# Patient Record
Sex: MALE | Race: WHITE | NOT HISPANIC OR LATINO | Employment: FULL TIME | URBAN - METROPOLITAN AREA
[De-identification: names, ages, dates, MRNs, and addresses within clinical notes are randomized per-mention and may not be internally consistent; named-entity substitution may affect disease eponyms.]

---

## 2024-11-20 ENCOUNTER — APPOINTMENT (OUTPATIENT)
Dept: OTOLARYNGOLOGY | Facility: CLINIC | Age: 44
End: 2024-11-20

## 2024-11-20 VITALS — WEIGHT: 233 LBS | TEMPERATURE: 98 F | HEIGHT: 74 IN | BODY MASS INDEX: 29.9 KG/M2

## 2024-11-20 DIAGNOSIS — G47.33 OSA (OBSTRUCTIVE SLEEP APNEA): ICD-10-CM

## 2024-11-20 PROCEDURE — 31575 DIAGNOSTIC LARYNGOSCOPY: CPT

## 2024-11-20 PROCEDURE — 3008F BODY MASS INDEX DOCD: CPT

## 2024-11-20 PROCEDURE — 99204 OFFICE O/P NEW MOD 45 MIN: CPT

## 2024-11-20 RX ORDER — LOSARTAN POTASSIUM AND HYDROCHLOROTHIAZIDE 12.5; 5 MG/1; MG/1
1 TABLET ORAL
COMMUNITY
Start: 2024-10-24 | End: 2025-01-22

## 2024-11-20 RX ORDER — ALBUTEROL SULFATE 90 UG/1
INHALANT RESPIRATORY (INHALATION)
COMMUNITY
Start: 2024-03-06

## 2024-11-20 RX ORDER — AMLODIPINE BESYLATE 10 MG/1
10 TABLET ORAL
COMMUNITY
Start: 2023-12-15

## 2024-11-20 NOTE — PROGRESS NOTES
"11/20/2024 New patient visit for SAILAJA and sleep consult    Pt saw Dr Rhoda Tobin MD. from [facility] on 4/11/2023 and she writes  \"Patient is a 42 year old male with past medical history of HTN and SAILAJA who is here at the sleep disorder center to learn more about treatment options for his severe obstructive sleep apnea. Patient was diagnosed with SAILAJA in 2020 with a home sleep apnea test showing a total MEDARDO of 48 events per hour. During that sleep study, possible central apneas were observed for which a PAP titration was recommended. He was started on auto CPAP 5-16 instead and since then he has had difficulties with pressures. He denies any daytime sleepiness his only complain is snoring. His difficulties with his CPAP are\" difficulties exhaling, feels claustrophobic due to high pressures. He has been wearing an oral appliance since last year. He got it from his dentist, his snoring improved but he never underwent sleep testing again.     Other treatment options including INSPIRE were discussed with him and also the role of weight loss. I reviewed his old download when he was using it back in [id number] and his residual AHI was normal at least for the 1 hour of usage that was recorded. This makes me think that he doesn't have concomitant central sleep apneas seen in his initial home sleep apnea test.     -I was able to adjust his pressures to 6-13 cm of water pressure remotely along with adjusting his RAMP pressure to 45 min, e-flex was at 3  -Recommend a follow up visit in office in 1 month. If issues continue will consider INSPIRE as a treatment option, however he will need a in-lab PSG before he is evaluated by ENT to ensure he doesn't have complex sleep apnea\"      Patient can not tolerate PAP. Has lost weight but still significantly tired and sleepy.     Sleep study histories: (personally reviewed raw data such as interpretation report, data sheet, hypnogram, and titration table)  Home Sleep Test 11/16/2020 - AHI " 48.3.  Obstructive = 0, Mixed = 5, Central = 157 and Hypopneas = 146.   SPO2 cheng was 81%    Patient does reports nasal obstruction.  Patient had septoplasty but only moderate improvement in nasal obstruction.       Past PAP compliance   Compliance Summary  12/30/2020 - 1/22/2021 (24 days)  Days with Device Usage 22 days  Days without Device Usage 2 days  Percent Days with Device Usage 91.7%  Cumulative Usage 22 hrs. 57 mins. 54 secs.  Maximum Usage (1 Day) 3 hrs. 32 mins. 54 secs.  Average Usage (All Days) 57 mins. 24 secs.  Average Usage (Days Used) 1 hrs. 2 mins. 37 secs.  Minimum Usage (1 Day) 8 secs.  Percent of Days with Usage >= 4 Hours 0.0%  Percent of Days with Usage < 4 Hours 100.0%  Date Range  Total Blower Time 23 hrs. 45 mins. 3 secs.  Average AHI 4.8  Auto-CPAP Summary  Auto-CPAP Mean Pressure 6.2 cmH2O  Auto-CPAP Peak Average Pressure 10.0 cmH2O  Device Pressure <= 90% of Time 7.5 cmH2O  Average Time in Large Leak Per Day 8 secs.  Parameter Value  Min Pressure 6 cmH2O  Max Pressure 16 cmH2O       Physical Exam:    GENERAL:  Well-developed, well-nourished.      EYES:  Ocular motility intact.       EARS:  Otoscopy of external auditory canals and tympanic membranes is normal with clinical speech reception thresholds grossly intact. No mass/lesion of auricle.      NOSE:   Anterior rhinoscopy shows septum is midline bilateral inferior turbinate in adequate size.  Right Turbinate: 2  Left Turbinate: 2  Nasal Valve collapse: No, Woodward Maneuver is negative  Nasal mucosa is unremarkable.  There are no other masses polyps or purulent drainage.      NECK:  No cervical lymphadenopathy, no neck mass/crepitus/ asymmetry, trachea is midline, no thyroid enlargement/tenderness/mass.      OROPHARYNX:   Tonsil size: 1  Modified Mallampatti tongue position: 3  Tongue position is Freidman 3  Scalloping is noted.   Soft Palate: is low-lying;  is redundant  Patient has tongue-tie that restricts anterior tongue movement  and coupling to palate.     MAXILLOFACIAL:   On frontal repose, patient shows symmetrical facial thirds, symmetrical facial fifths.   There is not paranasal flattening.  There is not deep nasolabial folds.   On profile view, the patient has a adequate facial profile, with a Class 1 Facial Skeletal relationship. There is not a dorsal nasal hump.  Patient has a high arched palate with restricted transverse maxillary dimensions.     DENTAL:  The occlusal plane is normal. No Overjet. Overbite is not deep.  Right - Class 1 canine, Class 1 molar  Left - Class 1 canine, Class 1 molar  There is no transverse discrepancy or narrow maxilla.  Dentition: There is adequate dentition. There is no dental crowding.   No cross-bite.      TMJ:  On TMJ examination, the maximal incisal opening is about 45 mm. The jaw does not deviate towards the  upon opening. There is no TMJ click/pop. There is no TMJ tenderness upon function.      NEURO/PSYCH:  Cranial nerves grossly intact, oriented x3 (time, place, person), appropriate mood and affect.      RESPIRATION:  Symmetric expansion during respiration, normal respiratory effort.      CARDIOVASCULAR: Pulse is regular rhythm and rate      Procedure: Diagnostic Nasal/ Pharyngeal Endoscopy     Indication for procedure: To evaluate static and dynamic upper anatomy not visible by anterior rhinoscopy and oral cavitiy examination for anatomic risk factors of obstructive sleep apnea.      Anesthesia: 1% phenylephrine,4% lidocaine topical spray     Description:   A flexible endoscope was used to examine the left and right nasal cavities.  The nasal valve areas were examined for abnormalities or collapse.  The inferior and middle turbinates were evaluated and abnormalities noted. The scope was then passed through the nasopharygneal, oropharyngeal, and hypopharyngeal airway. The Martinez Maneuver was performed with the patient in sitting position.Collapse was assessed during a maximal inspiratory effort  against a closed mouth and sealed nose. The patient tolerated the procedure without complications and was returned to ambulatory status.       Findings:   Nasopharynx: There is not adenoid hypertrophy.   Oropharynx: There is not palatine tonsillar hypertrophy.   With Summers maneuver, soft palatal collapse is grade 2, lateral pharyngeal wall collapse is grade 2.  Hypopharynx: There is not lingual tonsillar hypertrophy, with lingual tonsils of Grade 2. Vocal Cord position with Grade 3 view.  With Summers maneuver, base of tongue collapse is grade 3. This is improved with the patient doing a jaw thrust maneuver.    Diagnose:  SAILAJA intolerant to PAP      Plan:  - In lab sleep test to quantify SAILAJA severity specifically central events and guide decision making process and possibly diagnose between different parasomnias.

## 2024-12-12 ENCOUNTER — TELEPHONE (OUTPATIENT)
Dept: OTOLARYNGOLOGY | Facility: HOSPITAL | Age: 44
End: 2024-12-12
Payer: COMMERCIAL

## 2024-12-12 NOTE — TELEPHONE ENCOUNTER
Topic: Fax sleep study order to [facility]    Pt called Dr Saxena's office:  Patient would like sleep order faxed to [facility] at [phone] or fax to [phone]     I faxed sleep study to the fax listed above. I confirmed receipt of fax. I called patient and he is aware that fax was sent.    Pt also informed that the Transition of Care Request was faxed to [facility] on 11/25/2024 and form states they will send a letter to the patient with the outcome of their decision.    Pt verbalized understanding